# Patient Record
Sex: MALE | Race: WHITE
[De-identification: names, ages, dates, MRNs, and addresses within clinical notes are randomized per-mention and may not be internally consistent; named-entity substitution may affect disease eponyms.]

---

## 2018-05-29 ENCOUNTER — HOSPITAL ENCOUNTER (EMERGENCY)
Dept: HOSPITAL 26 - MED | Age: 18
Discharge: HOME | End: 2018-05-29
Payer: MEDICAID

## 2018-05-29 VITALS — WEIGHT: 190 LBS | BODY MASS INDEX: 28.79 KG/M2 | HEIGHT: 68 IN

## 2018-05-29 VITALS — SYSTOLIC BLOOD PRESSURE: 125 MMHG | DIASTOLIC BLOOD PRESSURE: 50 MMHG

## 2018-05-29 VITALS — DIASTOLIC BLOOD PRESSURE: 56 MMHG | SYSTOLIC BLOOD PRESSURE: 128 MMHG

## 2018-05-29 DIAGNOSIS — M54.5: Primary | ICD-10-CM

## 2018-05-29 PROCEDURE — 96372 THER/PROPH/DIAG INJ SC/IM: CPT

## 2018-05-29 PROCEDURE — 99283 EMERGENCY DEPT VISIT LOW MDM: CPT

## 2018-05-29 RX ADMIN — KETOROLAC TROMETHAMINE ONE MG: 60 INJECTION, SOLUTION INTRAMUSCULAR at 17:42

## 2018-05-29 NOTE — NUR
Patient discharged with v/s stable. Written and verbal after care instructions 
given and explained. 

Patient alert, oriented and verbalized understanding of instructions. 
Ambulatory with steady gait. All questions addressed prior to discharge. ID 
band removed. Patient advised to follow up with PMD. Rx of IBUPROPHEN 600MG  
given. Patient educated on indication of medication including possible reaction 
and side effects. Opportunity to ask questions provided and answered.

## 2018-05-29 NOTE — NUR
18 YO M TO ER WITH C/O LBP X3DAYS. PT STATES WORSE PAIN UPON WAKING AND WITH 
WAKLING. 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  DENIES N/V/D; SKIN IS 
PINK/WARM/DRY; AAOX4 WITH EVEN AND STEADY GAIT; LUNGS CLEAR BL; HR EVEN AND 
REGULAR; PT DENIES ANY FEVER, CP, SOB, OR COUGH AT THIS TIME; PATIENT STATES 
PAIN OF 5/10 AT THIS TIME; VSS; PATIENT POSITIONED FOR COMFORT; HOB ELEVATED; 
BEDRAILS UP X2; BED DOWN. ER MD MADE AWARE OF PT STATUS.

## 2018-06-18 ENCOUNTER — HOSPITAL ENCOUNTER (EMERGENCY)
Dept: HOSPITAL 26 - MED | Age: 18
Discharge: HOME | End: 2018-06-18
Payer: MEDICAID

## 2018-06-18 VITALS — DIASTOLIC BLOOD PRESSURE: 64 MMHG | SYSTOLIC BLOOD PRESSURE: 132 MMHG

## 2018-06-18 VITALS — BODY MASS INDEX: 30.31 KG/M2 | HEIGHT: 68 IN | WEIGHT: 200 LBS

## 2018-06-18 VITALS — SYSTOLIC BLOOD PRESSURE: 109 MMHG | DIASTOLIC BLOOD PRESSURE: 60 MMHG

## 2018-06-18 DIAGNOSIS — R19.7: ICD-10-CM

## 2018-06-18 DIAGNOSIS — R10.30: Primary | ICD-10-CM

## 2018-06-18 DIAGNOSIS — R30.0: ICD-10-CM
